# Patient Record
Sex: FEMALE | Race: ASIAN | Employment: FULL TIME | URBAN - METROPOLITAN AREA
[De-identification: names, ages, dates, MRNs, and addresses within clinical notes are randomized per-mention and may not be internally consistent; named-entity substitution may affect disease eponyms.]

---

## 2018-04-12 ENCOUNTER — HOSPITAL ENCOUNTER (INPATIENT)
Age: 48
LOS: 2 days | Discharge: HOME | DRG: 872 | End: 2018-04-14
Attending: EMERGENCY MEDICINE | Admitting: INTERNAL MEDICINE
Payer: COMMERCIAL

## 2018-04-12 ENCOUNTER — APPOINTMENT (OUTPATIENT)
Dept: CT IMAGING | Age: 48
DRG: 872 | End: 2018-04-12
Attending: EMERGENCY MEDICINE
Payer: COMMERCIAL

## 2018-04-12 DIAGNOSIS — H60.21 ACUTE MALIGNANT OTITIS EXTERNA OF RIGHT EAR: Primary | ICD-10-CM

## 2018-04-12 DIAGNOSIS — A41.9 SEPSIS, DUE TO UNSPECIFIED ORGANISM: ICD-10-CM

## 2018-04-12 DIAGNOSIS — I10 ESSENTIAL HYPERTENSION: ICD-10-CM

## 2018-04-12 PROBLEM — H60.20 MALIGNANT OTITIS EXTERNA: Status: ACTIVE | Noted: 2018-04-12

## 2018-04-12 LAB
ALBUMIN SERPL-MCNC: 4.2 G/DL (ref 3.5–5)
ALBUMIN/GLOB SERPL: 1.1 {RATIO} (ref 1.1–2.2)
ALP SERPL-CCNC: 77 U/L (ref 45–117)
ALT SERPL-CCNC: 39 U/L (ref 12–78)
ANION GAP SERPL CALC-SCNC: 10 MMOL/L (ref 5–15)
APPEARANCE UR: CLEAR
AST SERPL-CCNC: 22 U/L (ref 15–37)
BASOPHILS # BLD: 0 K/UL (ref 0–0.1)
BASOPHILS NFR BLD: 0 % (ref 0–1)
BILIRUB SERPL-MCNC: 0.5 MG/DL (ref 0.2–1)
BILIRUB UR QL: NEGATIVE
BUN SERPL-MCNC: 14 MG/DL (ref 6–20)
BUN/CREAT SERPL: 15 (ref 12–20)
CALCIUM SERPL-MCNC: 8.9 MG/DL (ref 8.5–10.1)
CHLORIDE SERPL-SCNC: 100 MMOL/L (ref 97–108)
CO2 SERPL-SCNC: 29 MMOL/L (ref 21–32)
COLOR UR: ABNORMAL
CREAT SERPL-MCNC: 0.95 MG/DL (ref 0.55–1.02)
DIFFERENTIAL METHOD BLD: ABNORMAL
EOSINOPHIL # BLD: 0 K/UL (ref 0–0.4)
EOSINOPHIL NFR BLD: 0 % (ref 0–7)
ERYTHROCYTE [DISTWIDTH] IN BLOOD BY AUTOMATED COUNT: 13.2 % (ref 11.5–14.5)
GLOBULIN SER CALC-MCNC: 3.8 G/DL (ref 2–4)
GLUCOSE BLD STRIP.AUTO-MCNC: 129 MG/DL (ref 65–100)
GLUCOSE BLD STRIP.AUTO-MCNC: 177 MG/DL (ref 65–100)
GLUCOSE SERPL-MCNC: 264 MG/DL (ref 65–100)
GLUCOSE UR STRIP.AUTO-MCNC: 500 MG/DL
HCT VFR BLD AUTO: 40 % (ref 35–47)
HGB BLD-MCNC: 13.4 G/DL (ref 11.5–16)
HGB UR QL STRIP: NEGATIVE
IMM GRANULOCYTES # BLD: 0.1 K/UL (ref 0–0.04)
IMM GRANULOCYTES NFR BLD AUTO: 0 % (ref 0–0.5)
KETONES UR QL STRIP.AUTO: NEGATIVE MG/DL
LACTATE SERPL-SCNC: 1.4 MMOL/L (ref 0.4–2)
LACTATE SERPL-SCNC: 2.2 MMOL/L (ref 0.4–2)
LEUKOCYTE ESTERASE UR QL STRIP.AUTO: NEGATIVE
LYMPHOCYTES # BLD: 1 K/UL (ref 0.8–3.5)
LYMPHOCYTES NFR BLD: 8 % (ref 12–49)
MCH RBC QN AUTO: 28.9 PG (ref 26–34)
MCHC RBC AUTO-ENTMCNC: 33.5 G/DL (ref 30–36.5)
MCV RBC AUTO: 86.4 FL (ref 80–99)
MONOCYTES # BLD: 0.6 K/UL (ref 0–1)
MONOCYTES NFR BLD: 5 % (ref 5–13)
NEUTS SEG # BLD: 12.2 K/UL (ref 1.8–8)
NEUTS SEG NFR BLD: 87 % (ref 32–75)
NITRITE UR QL STRIP.AUTO: NEGATIVE
NRBC # BLD: 0 K/UL (ref 0–0.01)
NRBC BLD-RTO: 0 PER 100 WBC
PH UR STRIP: 7 [PH] (ref 5–8)
PLATELET # BLD AUTO: 290 K/UL (ref 150–400)
PMV BLD AUTO: 10.5 FL (ref 8.9–12.9)
POTASSIUM SERPL-SCNC: 3.7 MMOL/L (ref 3.5–5.1)
PROT SERPL-MCNC: 8 G/DL (ref 6.4–8.2)
PROT UR STRIP-MCNC: NEGATIVE MG/DL
RBC # BLD AUTO: 4.63 M/UL (ref 3.8–5.2)
SERVICE CMNT-IMP: ABNORMAL
SERVICE CMNT-IMP: ABNORMAL
SODIUM SERPL-SCNC: 139 MMOL/L (ref 136–145)
SP GR UR REFRACTOMETRY: 1.01 (ref 1–1.03)
UROBILINOGEN UR QL STRIP.AUTO: 0.2 EU/DL (ref 0.2–1)
WBC # BLD AUTO: 13.9 K/UL (ref 3.6–11)

## 2018-04-12 PROCEDURE — 87205 SMEAR GRAM STAIN: CPT | Performed by: INTERNAL MEDICINE

## 2018-04-12 PROCEDURE — 87040 BLOOD CULTURE FOR BACTERIA: CPT | Performed by: EMERGENCY MEDICINE

## 2018-04-12 PROCEDURE — 80053 COMPREHEN METABOLIC PANEL: CPT | Performed by: EMERGENCY MEDICINE

## 2018-04-12 PROCEDURE — 87147 CULTURE TYPE IMMUNOLOGIC: CPT | Performed by: INTERNAL MEDICINE

## 2018-04-12 PROCEDURE — 85025 COMPLETE CBC W/AUTO DIFF WBC: CPT | Performed by: EMERGENCY MEDICINE

## 2018-04-12 PROCEDURE — 83605 ASSAY OF LACTIC ACID: CPT | Performed by: EMERGENCY MEDICINE

## 2018-04-12 PROCEDURE — 74011250636 HC RX REV CODE- 250/636: Performed by: EMERGENCY MEDICINE

## 2018-04-12 PROCEDURE — 74011250636 HC RX REV CODE- 250/636: Performed by: INTERNAL MEDICINE

## 2018-04-12 PROCEDURE — 74011250637 HC RX REV CODE- 250/637: Performed by: INTERNAL MEDICINE

## 2018-04-12 PROCEDURE — 82962 GLUCOSE BLOOD TEST: CPT

## 2018-04-12 PROCEDURE — 70487 CT MAXILLOFACIAL W/DYE: CPT

## 2018-04-12 PROCEDURE — 36415 COLL VENOUS BLD VENIPUNCTURE: CPT | Performed by: EMERGENCY MEDICINE

## 2018-04-12 PROCEDURE — 74011250637 HC RX REV CODE- 250/637: Performed by: EMERGENCY MEDICINE

## 2018-04-12 PROCEDURE — 96365 THER/PROPH/DIAG IV INF INIT: CPT

## 2018-04-12 PROCEDURE — 65660000000 HC RM CCU STEPDOWN

## 2018-04-12 PROCEDURE — 99284 EMERGENCY DEPT VISIT MOD MDM: CPT

## 2018-04-12 PROCEDURE — 74011636320 HC RX REV CODE- 636/320: Performed by: EMERGENCY MEDICINE

## 2018-04-12 PROCEDURE — 81003 URINALYSIS AUTO W/O SCOPE: CPT | Performed by: EMERGENCY MEDICINE

## 2018-04-12 RX ORDER — DEXTROSE 50 % IN WATER (D50W) INTRAVENOUS SYRINGE
12.5-25 AS NEEDED
Status: DISCONTINUED | OUTPATIENT
Start: 2018-04-12 | End: 2018-04-14 | Stop reason: HOSPADM

## 2018-04-12 RX ORDER — FERROUS SULFATE, DRIED 160(50) MG
1 TABLET, EXTENDED RELEASE ORAL
Status: DISCONTINUED | OUTPATIENT
Start: 2018-04-13 | End: 2018-04-14 | Stop reason: HOSPADM

## 2018-04-12 RX ORDER — GLUCOSAM/CHONDRO/HERB 149/HYAL 750-100 MG
1 TABLET ORAL DAILY
COMMUNITY

## 2018-04-12 RX ORDER — DOXYCYCLINE HYCLATE 100 MG
100 TABLET ORAL
Status: COMPLETED | OUTPATIENT
Start: 2018-04-12 | End: 2018-04-12

## 2018-04-12 RX ORDER — SODIUM CHLORIDE 9 MG/ML
75 INJECTION, SOLUTION INTRAVENOUS CONTINUOUS
Status: DISCONTINUED | OUTPATIENT
Start: 2018-04-12 | End: 2018-04-14 | Stop reason: HOSPADM

## 2018-04-12 RX ORDER — SODIUM CHLORIDE 0.9 % (FLUSH) 0.9 %
5-10 SYRINGE (ML) INJECTION AS NEEDED
Status: DISCONTINUED | OUTPATIENT
Start: 2018-04-12 | End: 2018-04-14 | Stop reason: HOSPADM

## 2018-04-12 RX ORDER — ENOXAPARIN SODIUM 100 MG/ML
40 INJECTION SUBCUTANEOUS EVERY 24 HOURS
Status: DISCONTINUED | OUTPATIENT
Start: 2018-04-13 | End: 2018-04-12

## 2018-04-12 RX ORDER — SODIUM CHLORIDE 0.9 % (FLUSH) 0.9 %
10 SYRINGE (ML) INJECTION
Status: COMPLETED | OUTPATIENT
Start: 2018-04-12 | End: 2018-04-12

## 2018-04-12 RX ORDER — OXYCODONE AND ACETAMINOPHEN 5; 325 MG/1; MG/1
1 TABLET ORAL
Status: DISCONTINUED | OUTPATIENT
Start: 2018-04-12 | End: 2018-04-14 | Stop reason: HOSPADM

## 2018-04-12 RX ORDER — SODIUM CHLORIDE 0.9 % (FLUSH) 0.9 %
5-10 SYRINGE (ML) INJECTION EVERY 8 HOURS
Status: DISCONTINUED | OUTPATIENT
Start: 2018-04-12 | End: 2018-04-13

## 2018-04-12 RX ORDER — KETOTIFEN FUMARATE 0.35 MG/ML
1 SOLUTION/ DROPS OPHTHALMIC
COMMUNITY

## 2018-04-12 RX ORDER — CETIRIZINE HCL 10 MG
10 TABLET ORAL
Status: DISCONTINUED | OUTPATIENT
Start: 2018-04-12 | End: 2018-04-14 | Stop reason: HOSPADM

## 2018-04-12 RX ORDER — FLUTICASONE PROPIONATE 50 MCG
2 SPRAY, SUSPENSION (ML) NASAL DAILY
Status: DISCONTINUED | OUTPATIENT
Start: 2018-04-13 | End: 2018-04-14 | Stop reason: HOSPADM

## 2018-04-12 RX ORDER — FLUTICASONE PROPIONATE 50 MCG
1 SPRAY, SUSPENSION (ML) NASAL DAILY
COMMUNITY

## 2018-04-12 RX ORDER — SODIUM CHLORIDE 9 MG/ML
1000 INJECTION, SOLUTION INTRAVENOUS ONCE
Status: COMPLETED | OUTPATIENT
Start: 2018-04-12 | End: 2018-04-12

## 2018-04-12 RX ORDER — SODIUM CHLORIDE 9 MG/ML
50 INJECTION, SOLUTION INTRAVENOUS
Status: COMPLETED | OUTPATIENT
Start: 2018-04-12 | End: 2018-04-12

## 2018-04-12 RX ORDER — ENOXAPARIN SODIUM 100 MG/ML
30 INJECTION SUBCUTANEOUS EVERY 24 HOURS
Status: DISCONTINUED | OUTPATIENT
Start: 2018-04-13 | End: 2018-04-14 | Stop reason: HOSPADM

## 2018-04-12 RX ORDER — MAGNESIUM SULFATE 100 %
4 CRYSTALS MISCELLANEOUS AS NEEDED
Status: DISCONTINUED | OUTPATIENT
Start: 2018-04-12 | End: 2018-04-14 | Stop reason: HOSPADM

## 2018-04-12 RX ORDER — ONDANSETRON 2 MG/ML
4 INJECTION INTRAMUSCULAR; INTRAVENOUS
Status: DISCONTINUED | OUTPATIENT
Start: 2018-04-12 | End: 2018-04-14 | Stop reason: HOSPADM

## 2018-04-12 RX ORDER — LEVOFLOXACIN 5 MG/ML
750 INJECTION, SOLUTION INTRAVENOUS EVERY 24 HOURS
Status: DISCONTINUED | OUTPATIENT
Start: 2018-04-13 | End: 2018-04-14 | Stop reason: HOSPADM

## 2018-04-12 RX ORDER — CETIRIZINE HCL 10 MG
10 TABLET ORAL
COMMUNITY

## 2018-04-12 RX ORDER — ACETAMINOPHEN 500 MG
1000 TABLET ORAL ONCE
Status: COMPLETED | OUTPATIENT
Start: 2018-04-12 | End: 2018-04-12

## 2018-04-12 RX ORDER — INSULIN LISPRO 100 [IU]/ML
INJECTION, SOLUTION INTRAVENOUS; SUBCUTANEOUS
Status: DISCONTINUED | OUTPATIENT
Start: 2018-04-12 | End: 2018-04-14 | Stop reason: HOSPADM

## 2018-04-12 RX ORDER — GUAIFENESIN 100 MG/5ML
81 LIQUID (ML) ORAL DAILY
Status: DISCONTINUED | OUTPATIENT
Start: 2018-04-13 | End: 2018-04-14 | Stop reason: HOSPADM

## 2018-04-12 RX ORDER — ACETAMINOPHEN 325 MG/1
650 TABLET ORAL
Status: DISCONTINUED | OUTPATIENT
Start: 2018-04-12 | End: 2018-04-14 | Stop reason: HOSPADM

## 2018-04-12 RX ORDER — BISACODYL 5 MG
5 TABLET, DELAYED RELEASE (ENTERIC COATED) ORAL DAILY PRN
Status: DISCONTINUED | OUTPATIENT
Start: 2018-04-12 | End: 2018-04-14 | Stop reason: HOSPADM

## 2018-04-12 RX ORDER — LEVOFLOXACIN 5 MG/ML
750 INJECTION, SOLUTION INTRAVENOUS
Status: COMPLETED | OUTPATIENT
Start: 2018-04-12 | End: 2018-04-12

## 2018-04-12 RX ADMIN — ACETAMINOPHEN 650 MG: 325 TABLET ORAL at 22:19

## 2018-04-12 RX ADMIN — SODIUM CHLORIDE 1000 ML: 900 INJECTION, SOLUTION INTRAVENOUS at 16:17

## 2018-04-12 RX ADMIN — Medication 10 ML: at 22:12

## 2018-04-12 RX ADMIN — Medication 10 ML: at 12:01

## 2018-04-12 RX ADMIN — ACETAMINOPHEN 1000 MG: 500 TABLET, FILM COATED ORAL at 11:34

## 2018-04-12 RX ADMIN — Medication 10 ML: at 16:52

## 2018-04-12 RX ADMIN — IOPAMIDOL 100 ML: 755 INJECTION, SOLUTION INTRAVENOUS at 12:01

## 2018-04-12 RX ADMIN — SODIUM CHLORIDE 50 ML/HR: 900 INJECTION, SOLUTION INTRAVENOUS at 12:01

## 2018-04-12 RX ADMIN — LEVOFLOXACIN 750 MG: 5 INJECTION, SOLUTION INTRAVENOUS at 11:36

## 2018-04-12 RX ADMIN — SODIUM CHLORIDE 500 ML: 900 INJECTION, SOLUTION INTRAVENOUS at 11:36

## 2018-04-12 RX ADMIN — DOXYCYCLINE HYCLATE 100 MG: 100 TABLET, COATED ORAL at 11:34

## 2018-04-12 RX ADMIN — SODIUM CHLORIDE 1000 ML: 900 INJECTION, SOLUTION INTRAVENOUS at 11:36

## 2018-04-12 RX ADMIN — SODIUM CHLORIDE 75 ML/HR: 900 INJECTION, SOLUTION INTRAVENOUS at 16:46

## 2018-04-12 NOTE — IP AVS SNAPSHOT
[address] [facility] 
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 My Medications START taking these medications Instructions Each Dose to Equal  
 Morning Noon Evening Bedtime  
 levoFLOXacin 750 mg tablet Commonly known as:  Jakob Payer Your last dose was: Your next dose is: Take 1 Tab by mouth daily. 750 mg  
    
   
   
   
  
 neomycin-polymyxin-hydrocortisone otic solution Commonly known as:  CORTISPORIN Your last dose was: Your next dose is:    
   
   
 3-4 Drops by Otic route four (4) times daily for 10 days. 3-4 Drop  
    
   
   
   
  
 oxyCODONE-acetaminophen 5-325 mg per tablet Commonly known as:  PERCOCET Your last dose was: Your next dose is: Take 1 Tab by mouth every four (4) hours as needed. Max Daily Amount: 6 Tabs. 1 Tab CHANGE how you take these medications Instructions Each Dose to Equal  
 Morning Noon Evening Bedtime  
 cetirizine 10 mg tablet Commonly known as:  ZYRTEC What changed:  Another medication with the same name was removed. Continue taking this medication, and follow the directions you see here. Your last dose was: Your next dose is: Take 10 mg by mouth daily as needed for Allergies. 10 mg CONTINUE taking these medications Instructions Each Dose to Equal  
 Morning Noon Evening Bedtime  
 aspirin 81 mg tablet Your last dose was: Your next dose is: Take 81 mg by mouth daily. 81 mg CALCIUM + D PO Your last dose was: Your next dose is: Take 1 Tab by mouth daily. 1 Tab EXCEDRIN EXTRA STRENGTH PO Your last dose was: Your next dose is: Take 1 Cap by mouth daily as needed for Pain. 1 Cap FLONASE ALLERGY RELIEF 50 mcg/actuation nasal spray Generic drug:  fluticasone Your last dose was: Your next dose is:    
   
   
 1 Spray by Both Nostrils route daily. 1 Spray  
    
   
   
   
  
 losartan 50 mg tab 50 mg, hydroCHLOROthiazide 12.5 mg cap 12.5 mg Your last dose was: Your next dose is: Take 1 Dose by mouth daily. 1 Dose  
    
   
   
   
  
 methyl salicylate-menthol 14-59 % topical cream  
Commonly known as:  Deitra Caprice Your last dose was: Your next dose is:    
   
   
 Apply  to affected area daily as needed for Pain or Stiffness. MULTI-VITAMIN PO Your last dose was: Your next dose is: Take 1 Tab by mouth daily. 1 Tab  
    
   
   
   
  
 omega 3-DHA-EPA-fish oil 1,000 mg (120 mg-180 mg) capsule Your last dose was: Your next dose is: Take 1 Cap by mouth daily. 1 Cap ZADITOR 0.025 % (0.035 %) ophthalmic solution Generic drug:  ketotifen Your last dose was: Your next dose is:    
   
   
 Administer 1 Drop to both eyes daily as needed (allergies). 1 Drop Where to Get Your Medications Information on where to get these meds will be given to you by the nurse or doctor. ! Ask your nurse or doctor about these medications  
  levoFLOXacin 750 mg tablet  
 neomycin-polymyxin-hydrocortisone otic solution  
 oxyCODONE-acetaminophen 5-325 mg per tablet Opioid Education Prescription Opioids: What You Need to Know: 
 
Prescription opioids can be used to help relieve moderate-to-severe pain and are often prescribed following a surgery or injury, or for certain health conditions. These medications can be an important part of treatment but also come with serious risks. Opioids are strong pain medicines. Examples include hydrocodone, oxycodone, fentanyl, and morphine. Heroin is an example of an illegal opioid. It is important to work with your health care provider to make sure you are getting the safest, most effective care. WHAT ARE THE RISKS AND SIDE EFFECTS OF OPIOID USE? Prescription opioids carry serious risks of addiction and overdose, especially with prolonged use. An opioid overdose, often marked by slow breathing, can cause sudden death. The use of prescription opioids can have a number of side effects as well, even when taken as directed. · Tolerance-meaning you might need to take more of a medication for the same pain relief · Physical dependence-meaning you have symptoms of withdrawal when the medication is stopped. Withdrawal symptoms can include nausea, sweating, chills, diarrhea, stomach cramps, and muscle aches. Withdrawal can last up to several weeks, depending on which drug you took and how long you took it. · Increased sensitivity to pain · Constipation · Nausea, vomiting, and dry mouth · Sleepiness and dizziness · Confusion · Depression · Low levels of testosterone that can result in lower sex drive, energy, and strength · Itching and sweating RISKS ARE GREATER WITH:      
· History of drug misuse, substance use disorder, or overdose · Mental health conditions (such as depression or anxiety) · Sleep apnea · Older age (72 years or older) · Pregnancy Avoid alcohol while taking prescription opioids. Also, unless specifically advised by your health care provider, medications to avoid include: · Benzodiazepines (such as Xanax or Valium) · Muscle relaxants (such as Soma or Flexeril) · Hypnotics (such as Ambien or Lunesta) · Other prescription opioids KNOW YOUR OPTIONS Talk to your health care provider about ways to manage your pain that don't involve prescription opioids. Some of these options may actually work better and have fewer risks and side effects. Options may include: 
· Pain relievers such as acetaminophen, ibuprofen, and naproxen · Some medications that are also used for depression or seizures · Physical therapy and exercise · Counseling to help patients learn how to cope better with triggers of pain and stress. · Application of heat or cold compress · Massage therapy · Relaxation techniques Be Informed Make sure you know the name of your medication, how much and how often to take it, and its potential risks & side effects. IF YOU ARE PRESCRIBED OPIOIDS FOR PAIN: 
· Never take opioids in greater amounts or more often than prescribed. Remember the goal is not to be pain-free but to manage your pain at a tolerable level. · Follow up with your primary care provider to: · Work together to create a plan on how to manage your pain. · Talk about ways to help manage your pain that don't involve prescription opioids. · Talk about any and all concerns and side effects. · Help prevent misuse and abuse. · Never sell or share prescription opioids · Help prevent misuse and abuse. · Store prescription opioids in a secure place and out of reach of others (this may include visitors, children, friends, and family).  
· Safely dispose of unused/unwanted prescription opioids: Find your community drug take-back program or your pharmacy mail-back program, or flush them down the toilet, following guidance from the Food and Drug Administration (www.fda.gov/Drugs/ResourcesForYou). · Visit www.cdc.gov/drugoverdose to learn about the risks of opioid abuse and overdose. · If you believe you may be struggling with addiction, tell your health care provider and ask for guidance or call [employer] at 8-916-802-GPPJ. Discharge Instructions None [employer] Announcement We are excited to announce that we are making your provider's discharge notes available to you in [employer]. You will see these notes when they are completed and signed by the physician that discharged you from your recent hospital stay. If you have any questions or concerns about any information you see in [employer], please call the Health Information Department where you were seen or reach out to your Primary Care Provider for more information about your plan of care. Introducing [facility]! Landen Vang introduces [employer] patient portal. Now you can access parts of your medical record, email your doctor's office, and request medication refills online. 1. In your internet browser, go to [email or web address] 2. Click on the First Time User? Click Here link in the Sign In box. You will see the New Member Sign Up page. 3. Enter your [employer] Access Code exactly as it appears below. You will not need to use this code after youve completed the sign-up process. If you do not sign up before the expiration date, you must request a new code. · [employer] Access Code: [email or web address] Expires: 7/11/2018 10:03 AM 
 
4. Enter the last four digits of your Social Security Number (xxxx) and Date of Birth (mm/dd/yyyy) as indicated and click Submit. You will be taken to the next sign-up page. 5. Create a [employer] ID. This will be your [employer] login ID and cannot be changed, so think of one that is secure and easy to remember. 6. Create a [employer] password. You can change your password at any time. 7. Enter your Password Reset Question and Answer. This can be used at a later time if you forget your password. 8. Enter your e-mail address. You will receive e-mail notification when new information is available in [facility] 9. Click Sign Up. You can now view and download portions of your medical record. 10. Click the Download Summary menu link to download a portable copy of your medical information. If you have questions, please visit the Frequently Asked Questions section of the [employer] website. Remember, [employer] is NOT to be used for urgent needs. For medical emergencies, dial 911. Now available from your iPhone and Android! Introducing Tc Damian As a [facility] patient, I wanted to make you aware of our electronic visit tool called Tc Mathieudougglenn. [facility] 24/7 allows you to connect within minutes with a medical provider 24 hours a day, seven days a week via a mobile device or tablet or logging into a secure website from your computer. You can access Tc Damian from anywhere in the [place]. A virtual visit might be right for you when you have a simple condition and feel like you just dont want to get out of bed, or cant get away from work for an appointment, when your regular [facility] provider is not available (evenings, weekends or holidays), or when youre out of town and need minor care. Electronic visits cost only $49 and if the [facility] 24/7 provider determines a prescription is needed to treat your condition, one can be electronically transmitted to a nearby pharmacy*. Please take a moment to enroll today if you have not already done so. The enrollment process is free and takes just a few minutes.   To enroll, please download the [facility] 24/7 giovana to your tablet or phone, or visit [email or web address] to enroll on your computer. And, as an [address] [place] [address] patient with a [employer] account, the results of your visits will be scanned into your electronic medical record and your primary care provider will be able to view the scanned results. We urge you to continue to see your regular [facility] provider for your ongoing medical care. And while your primary care provider may not be the one available when you seek a [employer] virtual visit, the peace of mind you get from getting a real diagnosis real time can be priceless. For more information on [employer], view our Frequently Asked Questions (FAQs) at [email or web address]. Sincerely, 
 
Beena Fallon MD 
Chief Medical Officer [employer] *:  certain medications cannot be prescribed via [employer] Unresulted Labs-Please follow up with your PCP about these lab tests Order Current Status C REACTIVE PROTEIN, QT In process CULTURE, BLOOD, PAIRED Preliminary result CULTURE, WOUND W GRAM STAIN Preliminary result Providers Seen During Your Hospitalization Provider Specialty Primary office phone Yolanda Tyson, DO Emergency Medicine [phone] Dillon Guerra, DO Internal Medicine [phone] Your Primary Care Physician (PCP) Primary Care Physician Office Phone Office Fax Rachel Dia [phone] You are allergic to the following Allergen Reactions Pollen Extracts Not Reported This Time Recent Documentation Height Weight Breastfeeding? BMI OB Status Smoking Status 1.473 m 55.9 kg No 25.76 kg/m2 Having regular periods Never Smoker Emergency Contacts Name Discharge Info Relation Home Work Mobile None,None N/A  AT THIS TIME [6] Patient Belongings The following personal items are in your possession at time of discharge: 
  Dental Appliances: None  Visual Aid: Glasses      Home Medications: None   Jewelry: None  Clothing: Footwear, Pants, Shirt    Other Valuables: None  Personal Items Sent to Safe: none Please provide this summary of care documentation to your next provider. Signatures-by signing, you are acknowledging that this After Visit Summary has been reviewed with you and you have received a copy. Patient Signature:  ____________________________________________________________ Date:  ____________________________________________________________  
  
[facility] Provider Signature:  ____________________________________________________________ Date:  ____________________________________________________________

## 2018-04-12 NOTE — PROGRESS NOTES
TRANSFER - IN REPORT:    Verbal report received from Yarely(name) on Anny Gibson  being received from ED(unit) for routine progression of care      Report consisted of patients Situation, Background, Assessment and   Recommendations(SBAR). Information from the following report(s) SBAR, Kardex, [facility] and Recent Results was reviewed with the receiving nurse. Opportunity for questions and clarification was provided. Assessment completed upon patients arrival to unit and care assumed.

## 2018-04-12 NOTE — ED NOTES
TRANSFER - OUT REPORT:    Verbal report given to DONNELL Villegas (name) on Anny Gibson  being transferred to [facility] (unit) for routine progression of care       Report consisted of patients Situation, Background, Assessment and   Recommendations(SBAR). Information from the following report(s) SBAR, Kardex, ED Summary, Intake/Output, MAR, Recent Results and Med Rec Status was reviewed with the receiving nurse. Lines:   Peripheral IV 04/12/18 Right Antecubital (Active)   Site Assessment Clean, dry, & intact 4/12/2018 10:36 AM   Phlebitis Assessment 0 4/12/2018 10:29 AM   Infiltration Assessment 0 4/12/2018 10:29 AM   Dressing Status Clean, dry, & intact 4/12/2018 10:29 AM   Dressing Type Transparent 4/12/2018 10:29 AM   Hub Color/Line Status Pink;Flushed 4/12/2018 10:29 AM   Action Taken Blood drawn 4/12/2018 10:29 AM        Opportunity for questions and clarification was provided.       Patient transported with:   [employer]

## 2018-04-12 NOTE — PROGRESS NOTES
Bedside shift change report given to Tra Doll (oncoming nurse) by Annemarie Bond (offgoing nurse). Report included the following information SBAR, Kardex, Intake/Output, MAR and Recent Results.

## 2018-04-12 NOTE — PROGRESS NOTES
Bedside shift change report given to Chilango Mays (oncoming nurse) by Dante Fong (offgoing nurse). Report included the following information SBAR, Kardex, Intake/Output, MAR and Recent Results.

## 2018-04-12 NOTE — PROGRESS NOTES
Primary Nurse Rochelle Sosa and Irene SUMMERS, RN performed a dual skin assessment on this patient No impairment noted  Cyril score is 22

## 2018-04-12 NOTE — ED PROVIDER NOTES
EMERGENCY DEPARTMENT HISTORY AND PHYSICAL EXAM      Date: 4/12/2018  Patient Name: Genesis Lovell    History of Presenting Illness     Chief Complaint   Patient presents with    Ear Pain     Ambulatory into triage referred by PCP for swollen lymph node and swollen red ear x last night Pt referred for CT scan and IV antibiotics       History Provided By: Patient    HPI: Genesis Lovell, 50 y.o. female with PMHx significant for HTN and DM, presents ambulatory to the ED as sent by her PCP for evaluation regarding complaint of constant, progressively worsening right sided ear/facial swelling x 2 days. Pt reports associated symptoms of F/C last night and notes presence of a mild sore throat. She states that she saw her PCP regarding symptoms this morning who was concerned symptoms may be due to [address] 54 angina and sent her to the ED for further evaluation and treatment. Pt denies any difficulty swallowing, CP, SOB, or further complaints. She denies tobacco or alcohol use. PCP: Rai Dunn NP    There are no other complaints, changes, or physical findings at this time.     Current Facility-Administered Medications   Medication Dose Route Frequency Provider Last Rate Last Dose    [START ON 4/13/2018] levoFLOXacin (LEVAQUIN) 750 mg in D5W IVPB  750 mg IntraVENous Q24H Anthony Machuca MD        cetirizine (ZYRTEC) tablet 10 mg  10 mg Oral DAILY PRN MD Rubén Burciaga [START ON 4/13/2018] fluticasone (FLONASE) 50 mcg/actuation nasal spray 2 Spray  2 Spray Both Nostrils DAILY Anthony Machuca MD        artificial tears (dextran 70-hypromellose) (NATURAL BALANCE) 0.1-0.3 % ophthalmic solution 2 Drop  2 Drop Both Eyes PRN Anthony Machuca MD        sodium chloride (NS) flush 5-10 mL  5-10 mL IntraVENous Q8H Anthony Machuca MD   10 mL at 04/12/18 2212    sodium chloride (NS) flush 5-10 mL  5-10 mL IntraVENous PRN Anthony Machuca MD        acetaminophen (TYLENOL) tablet 650 mg  650 mg Oral Q4H PRN Beatris Keene MD   650 mg at 04/12/18 2219    oxyCODONE-acetaminophen (PERCOCET) 5-325 mg per tablet 1 Tab  1 Tab Oral Q4H PRN Micheline Andrade MD        ondansetron (ZOFRAN) injection 4 mg  4 mg IntraVENous Q4H PRN Micheline Andrade MD        bisacodyl (DULCOLAX) tablet 5 mg  5 mg Oral DAILY PRN Micheline Andrade MD        0.9% sodium chloride infusion  75 mL/hr IntraVENous CONTINUOUS Micheline Andrade MD 75 mL/hr at 04/12/18 1646 75 mL/hr at 04/12/18 1646    insulin lispro (HUMALOG) injection   SubCUTAneous AC&HS Micheline Andrade MD   Stopped at 04/12/18 1630    glucose chewable tablet 16 g  4 Tab Oral PRN Micheline Andrade MD        dextrose (D50W) injection syrg 12.5-25 g  12.5-25 g IntraVENous PRN Micheline Andrade MD        glucagon (GLUCAGEN) injection 1 mg  1 mg IntraMUSCular PRN Micheline Andrade MD        [START ON 4/13/2018] calcium-vitamin D (OS-DELMA) 500 mg-200 unit tablet  1 Tab Oral DAILY WITH BREAKFAST Micheline Andrade MD        [START ON 4/13/2018] aspirin chewable tablet 81 mg  81 mg Oral DAILY Micheline Andrade MD        [START ON 4/13/2018] enoxaparin (LOVENOX) injection 30 mg  30 mg SubCUTAneous Q24H Micheline Andrade MD           Past History     Past Medical History:  Past Medical History:   Diagnosis Date    Diabetes mellitus ([facility] [address]     Hypertension     Malignant otitis externa 4/12/2018       Past Surgical History:  History reviewed. No pertinent surgical history. Family History:  Family History   Problem Relation Age of Onset    Diabetes Mother     Hypertension Father        Social History:  Social History   Substance Use Topics    Smoking status: Never Smoker    Smokeless tobacco: Never Used    Alcohol use No      Comment: socially once a year       Allergies: Allergies   Allergen Reactions    Pollen Extracts Not Reported This Time         Review of Systems   Review of Systems   Constitutional: Positive for chills and fever. HENT: Positive for sore throat (mild).  Negative for congestion and trouble swallowing. Positive for right sided ear/facial swelling. Eyes: Negative for visual disturbance. Respiratory: Negative for cough and shortness of breath. Cardiovascular: Negative for chest pain and leg swelling. Gastrointestinal: Negative for abdominal pain, blood in stool, diarrhea and nausea. Endocrine: Negative for polyuria. Genitourinary: Negative for dysuria, flank pain, vaginal bleeding and vaginal discharge. Musculoskeletal: Negative for myalgias. Skin: Negative for rash. Allergic/Immunologic: Negative for immunocompromised state. Neurological: Negative for weakness and headaches. Psychiatric/Behavioral: Negative for confusion. Physical Exam   Physical Exam   Constitutional: She is oriented to person, place, and time. She appears well-developed and well-nourished. HENT:   Head: Normocephalic and atraumatic. Moist mucous membranes  Uvula elevates at midline. No trismus. There is submandibular lymphadenopathy. Swelling to right ear with no TTP. Swelling over the mastoid process. Right TM is normal. Right ear canal normal.   No evidence of abscess to ear. Eyes: Conjunctivae are normal. Pupils are equal, round, and reactive to light. Right eye exhibits no discharge. Left eye exhibits no discharge. Neck: Normal range of motion. Neck supple. No tracheal deviation present. No crepitus. No pain with movement of the hyoid. Full ROM. Cardiovascular: Normal rate, regular rhythm and normal heart sounds. No murmur heard. Pulmonary/Chest: Effort normal and breath sounds normal. No respiratory distress. She has no wheezes. She has no rales. Abdominal: Soft. Bowel sounds are normal. There is no tenderness. There is no rebound and no guarding. Musculoskeletal: Normal range of motion. She exhibits no edema, tenderness or deformity. Neurological: She is alert and oriented to person, place, and time. Skin: Skin is warm and dry. No rash noted.  No erythema. Psychiatric: Her behavior is normal.   Nursing note and vitals reviewed. Diagnostic Study Results     Labs -  Recent Results (from the past 12 hour(s))   URINALYSIS W/ RFLX MICROSCOPIC    Collection Time: 04/12/18 11:42 AM   Result Value Ref Range    Color YELLOW/STRAW      Appearance CLEAR CLEAR      Specific gravity 1.012 1.003 - 1.030      pH (UA) 7.0 5.0 - 8.0      Protein NEGATIVE  NEG mg/dL    Glucose 500 (A) NEG mg/dL    Ketone NEGATIVE  NEG mg/dL    Bilirubin NEGATIVE  NEG      Blood NEGATIVE  NEG      Urobilinogen 0.2 0.2 - 1.0 EU/dL    Nitrites NEGATIVE  NEG      Leukocyte Esterase NEGATIVE  NEG     CULTURE, WOUND W GRAM STAIN    Collection Time: 04/12/18  4:14 PM   Result Value Ref Range    Special Requests: NO SPECIAL REQUESTS      GRAM STAIN NO WBC'S SEEN      GRAM STAIN NO ORGANISMS SEEN      Culture result: PENDING    LACTIC ACID    Collection Time: 04/12/18  4:22 PM   Result Value Ref Range    Lactic acid 1.4 0.4 - 2.0 MMOL/L   GLUCOSE, POC    Collection Time: 04/12/18  4:47 PM   Result Value Ref Range    Glucose (POC) 129 (H) 65 - 100 mg/dL    Performed by Lorrane Carjessi    GLUCOSE, POC    Collection Time: 04/12/18  9:06 PM   Result Value Ref Range    Glucose (POC) 177 (H) 65 - 100 mg/dL    Performed by Lorrane Carjessi        Radiologic Studies -  CT Results  (Last 48 hours)               04/12/18 1201  CT MAXILLOFACIAL W CONT Final result    Impression:  IMPRESSION: Inflammatory changes involving the right ear and proximal right   external auditory canal, with no associated fluid collection. Mild edema in the   superficial lobe of the right parotid gland is likely secondary to the   inflammatory process involving the ear. Borderline-enlarged right cervical lymph   nodes are likely reactive. Clinical follow-up is recommended.                    Narrative:  EXAM:  CT MAXILLOFACIAL W CONT       INDICATION:   Mass, lump or swelling, maxface; infection, assess for abscess COMPARISON:  None. CONTRAST:   100 mL of Isovue-300       TECHNIQUE:  Multislice helical CT of the facial bones was performed in the axial   plane during uneventful rapid bolus intravenous contrast administration. Coronal   and sagittal reformations were generated. CT dose reduction was achieved   through use of a standardized protocol tailored for this examination and   automatic exposure control for dose modulation. FINDINGS:       There is focal skin thickening in the right ear, extending into the proximal   right external auditory canal. Adjacent subcutaneous fat stranding is evident,   but there is no associated fluid collection. Mild edema is seen in the   superficial lobe of the right parotid gland. There are borderline enlarged right   cervical lymph nodes. There is no facial fracture or other osseous abnormality. The visualized paranasal sinuses and mastoid air cells are clear. The globes, optic nerves and extraocular muscles are normal.       No abnormalities are identified within the visualized portions of the brain or   nasopharynx. Medical Decision Making   I am the first provider for this patient. I reviewed the vital signs, available nursing notes, past medical history, past surgical history, family history and social history. Vital Signs-Reviewed the patient's vital signs. Patient Vitals for the past 12 hrs:   Temp Pulse Resp BP SpO2   04/12/18 2233 (!) 100.5 °F (38.1 °C) 92 18 136/77 98 %   04/12/18 1951 99.6 °F (37.6 °C) 94 18 133/74 96 %   04/12/18 1642 98.9 °F (37.2 °C) 95 18 109/64 99 %   04/12/18 1145 - (!) 103 18 142/81 99 %       Records Reviewed: Nursing Notes and Old Medical Records    Provider Notes (Medical Decision Making):   Physical exam consistent with malignant otitis externa. Pt is tachycardic, has signs of SIRS, will likely need hospitalization for abx.  No evidence of abscess on physical exam.     ED Course:   Initial assessment performed. The patients presenting problems have been discussed, and they are in agreement with the care plan formulated and outlined with them. I have encouraged them to ask questions as they arise throughout their visit. 10:25 AM - I suspect that this patient has an active infection. 11:12 AM - The patient met criteria for severe sepsis at this time. PROVIDER SEPSIS PHYSICAL EXAM EVAL  Vital signs reviewed (see nursing documentation for further details):  Vitals:    04/12/18 1145 04/12/18 1642 04/12/18 1951 04/12/18 2233   BP: 142/81 109/64 133/74 136/77   Pulse: (!) 103 95 94 92   Resp: 18 18 18 18   Temp:  98.9 °F (37.2 °C) 99.6 °F (37.6 °C) (!) 100.5 °F (38.1 °C)   SpO2: 99% 99% 96% 98%     Cardiac exam:Tachycardic  Pulmonary exam:Normal  Peripheral pulses:Normal  Capillary refill:Normal  Skin exam:pink    Exam performed by Marge Golden, *    CONSULT NOTE:   12:56 PM   Mily Dorado DO spoke with Jonathan Desir MD,   Specialty: Hospitalist  Discussed pt's hx, disposition, and available diagnostic and imaging results. Reviewed care plans. Consultant will evaluate pt for admission. CRITICAL CARE NOTE:  IMPENDING DETERIORATION -Cardiovascular and Metabolic  ASSOCIATED RISK FACTORS - Metabolic changes and Dehydration  MANAGEMENT- Bedside Assessment  INTERPRETATION -  CT Scan, ECG and Blood Pressure  INTERVENTIONS - fluid administration, metabolic interventions, antibiotics  CASE REVIEW - Hospitalist and Nursing  TREATMENT RESPONSE -Improved  PERFORMED BY - Self  NOTES:  I have spent 35 minutes of critical care time involved in lab review, consultations with specialist, family decision- making, bedside attention and documentation. During this entire length of time I was immediately available to the patient.   Mily Dorado DO    Disposition:  Admit Note:  12:56 PM  Patient is being admitted to the hospital by Dr. Jonathan Desir MD.  The results of their tests and reasons for their admission have been discussed with them and/or available family. They convey agreement and understanding for the need to be admitted and for their admission diagnosis. Consultation has been made with the inpatient physician specialist for hospitalization. PLAN:  1. Admit to Hospitalist    Diagnosis     Clinical Impression:   1. Acute malignant otitis externa of right ear    2. Essential hypertension    3. Sepsis, due to unspecified organism [facility])        Attestations: This note is prepared by Teresita Encinas, acting as Scribe for Madelaine Tripp DO. Madelaine Tripp DO: The scribe's documentation has been prepared under my direction and personally reviewed by me in its entirety. I confirm that the note above accurately reflects all work, treatment, procedures, and medical decision making performed by me.

## 2018-04-12 NOTE — PROGRESS NOTES
Pharmacy  Enoxaparin (Lovenox®) Monitoring/Dosing      Indication: VTE prophylaxis     Current Dose: Enoxaparin 40 subcutaneously every 24 hours    Creatinine Clearance (mL/min): >30 mL/min      Labs:  Recent Labs      04/12/18   1027   CREA  0.95   HGB  13.4   PLT  290     Wt Readings from Last 1 Encounters:   04/12/18 55.9 kg (123 lb 3.8 oz)     Ht Readings from Last 1 Encounters:   04/12/18 147.3 cm (58\")       Impression/Plan:    Scr stable   Empirically adjusted enoxaparin regimen from 40 mg to 30 mg subcutaneously q 24 hours per protocol as TBW <60 kg      Thanks,    Karie Price, PharmD, [facility]

## 2018-04-12 NOTE — PROGRESS NOTES
Pharmacy Clarification of Prior to Admission Medication Regimen     The patient was interviewed regarding clarification of the prior to admission medication regimen and was questioned regarding use of any other inhalers, topical products, over the counter medications, herbal medications, vitamin products or ophthalmic/nasal/otic medication use. Information Obtained From: Patient, RX Query    Pertinent Pharmacy Findings:   Updated patients preferred outpatient pharmacy to: [facility] [address] [place] [facility] [address], [place] - [address] [facility]    PTA medication list was corrected to the following:     Prior to Admission Medications   Prescriptions Last Dose Informant Patient Reported? Taking? ASPIRIN/ACETAMINOPHEN/CAFFEINE (EXCEDRIN EXTRA STRENGTH PO) 2018 at Unknown time Self Yes Yes   Sig: Take 1 Cap by mouth daily as needed for Pain. CALCIUM CARBONATE/VITAMIN D3 (CALCIUM + D PO) 2018 at Unknown time Self Yes Yes   Sig: Take 1 Tab by mouth daily. MULTI-VITAMIN PO 2018 at Unknown time Self Yes Yes   Sig: Take 1 Tab by mouth daily. aspirin 81 mg tablet 2018 at Unknown time Self Yes Yes   Sig: Take 81 mg by mouth daily. cetirizine (ZYRTEC) 10 mg tablet 2018 at Unknown time Self Yes Yes   Sig: Take 10 mg by mouth daily as needed for Allergies. fluticasone (FLONASE ALLERGY RELIEF) 50 mcg/actuation nasal spray 2018 at Unknown time Self Yes Yes   [place] by Both Nostrils route daily. ketotifen (ZADITOR) 0.025 % (0.035 %) ophthalmic solution 2018 at Unknown time Self Yes Yes   Sig: Administer 1 Drop to both eyes daily as needed (allergies). losartan 50 mg tab 50 mg, hydroCHLOROthiazide 12.5 mg cap 12.5 mg 2018 at Unknown time Self Yes Yes   Sig: Take 1 Dose by mouth daily. methyl salicylate-menthol (BENGAY) 15-10 % topical cream 4/10/2018 at Unknown time Self Yes Yes   Sig: Apply  to affected area daily as needed for Pain or Stiffness.    omega 3-DHA-EPA-fish oil 1,000 mg (120 mg-180 mg) capsule 4/11/2018 at Unknown time Self Yes Yes   Sig: Take 1 Cap by mouth daily.       Facility-Administered Medications: None          Thank you,  Celine Sims CPhT  Medication History Pharmacy Technician

## 2018-04-12 NOTE — ED TRIAGE NOTES
Assumed care of pt ambulatory from triage. Pt reports CC of ear swelling since last night. Pt reports yesterday morning she noticed her right gland was tender and last night her ear started swelling. This morning it was worse. Pt was seen at PCP and referred to ER. Pt on monitor x 2  Pt denies CP/ SOB/ N/V. Pt reports body aches. Pt resting on stretcher in POC with call bell in reach.

## 2018-04-12 NOTE — H&P
Hospitalist Admission Note    NAME: Eliana Lewis   :  1970   MRN:  [id number]     Date/Time:  2018 3:01 PM    Patient PCP: Milana Muse, HASMUKH  ______________________________________________________________________  Given the patient's current clinical presentation, I have a high level of concern for decompensation if discharged from the emergency department. Complex decision making was performed, which includes reviewing the patient's available past medical records, laboratory results, and x-ray films. My assessment of this patient's clinical condition and my plan of care is as follows. Assessment / Plan:  Malignant Otitis Externa  Severe Sepsis with tachycardia, Fever and Leukocytosis of 13.9  Elevated Lactic Acid level  -admit to telemetry  -IV Levaquin  -NS at 75 mL/hr  -percocet prn pain not controlled with acetaminophen  -await BCx's  -Get Culture of right ear canal  -monitor lactate    Type II DM, Diet controlled, A1c in Oct/2017 was 6.5 per patient report  -check A1c and place on SSI    Hypertension  -hold home losartan/HCTZ at this time    Code Status: Full    DVT Prophylaxis: lovenox  GI Prophylaxis: not indicated    Baseline: Functional      Subjective:   CHIEF COMPLAINT: ear pain and swelling    HISTORY OF PRESENT ILLNESS:     Clinton Be is a 50 y.o.  female who presents with right ear pain and swelling for the last 2 days. Patient reports that the tenderness started behind her right ear and rapidly progressed to involve her ear as well as swelling in her face. Patient reports fevers, chills and aching this am which prompted her presentation to her PCP where she was referred to the [facility] ED. Patient reports mild sore throat, swollen glands but denies any other concerning symptoms. In the ED patient was diagnosed with Malignant Otitis Externa and CT showed:  \"IMPRESSION: Inflammatory changes involving the right ear and proximal right external auditory canal, with no associated fluid collection. Mild edema in the superficial lobe of the right parotid gland is likely secondary to the inflammatory process involving the ear. Borderline-enlarged right cervical lymph nodes are likely reactive. Clinical follow-up is recommended. \"    Patient was febrile to 102.4, WBC was measured to 13.9. We were asked to admit for work up and evaluation of the above problems. Past Medical History:   Diagnosis Date    Diabetes mellitus ([address]     Hypertension     Malignant otitis externa 4/12/2018        Social History   Substance Use Topics    Smoking status: Never Smoker    Smokeless tobacco: Never Used    Alcohol use No      Comment: socially once a year        Family History   Problem Relation Age of Onset    Diabetes Mother     Hypertension Father      Allergies   Allergen Reactions    Pollen Extracts Not Reported This Time        Prior to Admission medications    Medication Sig Start Date End Date Taking? Authorizing Provider   cetirizine (ZYRTEC) 10 mg tablet Take 10 mg by mouth daily as needed for Allergies. Yes Dwayne Other, MD   losartan 50 mg tab 50 mg, hydroCHLOROthiazide 12.5 mg cap 12.5 mg Take 1 Dose by mouth daily. Yes Phys Other, MD   omega 3-DHA-EPA-fish oil 1,000 mg (120 mg-180 mg) capsule Take 1 Cap by mouth daily. Yes Phys Other, MD   ketotifen (ZADITOR) 0.025 % (0.035 %) ophthalmic solution Administer 1 Drop to both eyes daily as needed (allergies). Yes Dwayne Spring MD   ASPIRIN/ACETAMINOPHEN/CAFFEINE (EXCEDRIN EXTRA STRENGTH PO) Take 1 Cap by mouth daily as needed for Pain. Yes Dwayne Other, MD   fluticasone (FLONASE ALLERGY RELIEF) 50 mcg/actuation nasal spray 1 [place] by Both Nostrils route daily. Yes Phys Other, MD   methyl salicylate-menthol (BENGAY) 15-10 % topical cream Apply  to affected area daily as needed for Pain or Stiffness. Yes Phys Other, MD   aspirin 81 mg tablet Take 81 mg by mouth daily.    Yes Historical Provider   MULTI-VITAMIN PO Take 1 Tab by mouth daily. Yes Historical Provider   CALCIUM CARBONATE/VITAMIN D3 (CALCIUM + D PO) Take 1 Tab by mouth daily. Yes Historical Provider       REVIEW OF SYSTEMS:     A complete 10 point ROS was reviewed and is negative other than stated as per HPI with the exception of polyuria (reports fasting BG's are typically 160's). Objective:   VITALS:    Visit Vitals    /81    Pulse (!) 103    Temp (!) 102.4 °F (39.1 °C)    Resp 18    Ht 4' 10\" (1.473 m)    Wt 55.9 kg (123 lb 3.8 oz)    SpO2 99%    BMI 25.76 kg/m2       PHYSICAL EXAM:    General:    Alert, cooperative, no distress, appears stated age. HEENT: Atraumatic, anicteric sclerae, pink conjunctivae     No oral ulcers, mucosa dry, throat clear, dentition fair; submandibular LN's palpable on right  Neck:  Supple, symmetrical,  Thyroid not enlarged  Lungs:   Clear to auscultation bilaterally. No Wheezing or Rhonchi. No rales. Chest wall:  No tenderness  No Accessory muscle use. Heart:   Regular  Rhythm with tachycardia,  No  murmur   No edema  Abdomen:   Soft, non-tender. Not distended. Bowel sounds normal  Extremities: No cyanosis. No clubbing, Skin turgor normal, Capillary refill normal  Skin:     Not pale. Not Jaundiced  No rashes other than ear and mild erythema on left forearm  Psych:  Good insight. Not depressed. Not anxious or agitated. Neurologic: EOMs intact. No facial asymmetry. No aphasia or slurred speech. No focal neurologic deficits.  Alert and oriented X 4.                 _______________________________________________________________________  Care Plan discussed with:    Comments   Patient x    Family      RN     Care Manager                    Consultant:      _______________________________________________________________________  Expected  Disposition:   Home with Family x   HH/PT/OT/RN    SNF/LTC    CASH    ________________________________________________________________________  TOTAL TIME:  48 Minutes    Critical Care Provided     Minutes non procedure based      Comments    x Reviewed previous records   >50% of visit spent in counseling and coordination of care x Discussion with patient and/or family and questions answered       ________________________________________________________________________  Signed: Mara Ramirez MD    Procedures: see electronic medical records for all procedures/Xrays and details which were not copied into this note but were reviewed prior to creation of Plan. LAB DATA REVIEWED:    Recent Results (from the past 24 hour(s))   CBC WITH AUTOMATED DIFF    Collection Time: 04/12/18 10:27 AM   Result Value Ref Range    WBC 13.9 (H) 3.6 - 11.0 K/uL    RBC 4.63 3.80 - 5.20 M/uL    HGB 13.4 11.5 - 16.0 g/dL    HCT 40.0 35.0 - 47.0 %    MCV 86.4 80.0 - 99.0 FL    MCH 28.9 26.0 - 34.0 PG    MCHC 33.5 30.0 - 36.5 g/dL    RDW 13.2 11.5 - 14.5 %    PLATELET 510 619 - 875 K/uL    MPV 10.5 8.9 - 12.9 FL    NRBC 0.0 0  WBC    ABSOLUTE NRBC 0.00 0.00 - 0.01 K/uL    NEUTROPHILS 87 (H) 32 - 75 %    LYMPHOCYTES 8 (L) 12 - 49 %    MONOCYTES 5 5 - 13 %    EOSINOPHILS 0 0 - 7 %    BASOPHILS 0 0 - 1 %    IMMATURE GRANULOCYTES 0 0.0 - 0.5 %    ABS. NEUTROPHILS 12.2 (H) 1.8 - 8.0 K/UL    ABS. LYMPHOCYTES 1.0 0.8 - 3.5 K/UL    ABS. MONOCYTES 0.6 0.0 - 1.0 K/UL    ABS. EOSINOPHILS 0.0 0.0 - 0.4 K/UL    ABS. BASOPHILS 0.0 0.0 - 0.1 K/UL    ABS. IMM.  GRANS. 0.1 (H) 0.00 - 0.04 K/UL    DF AUTOMATED     METABOLIC PANEL, COMPREHENSIVE    Collection Time: 04/12/18 10:27 AM   Result Value Ref Range    Sodium 139 136 - 145 mmol/L    Potassium 3.7 3.5 - 5.1 mmol/L    Chloride 100 97 - 108 mmol/L    CO2 29 21 - 32 mmol/L    Anion gap 10 5 - 15 mmol/L    Glucose 264 (H) 65 - 100 mg/dL    BUN 14 6 - 20 MG/DL    Creatinine 0.95 0.55 - 1.02 MG/DL    BUN/Creatinine ratio 15 12 - 20      GFR est AA >60 >60 ml/min/1.73m2    GFR est non-AA >60 >60 ml/min/1.73m2    Calcium 8.9 8.5 - 10.1 MG/DL    Bilirubin, total 0.5 0.2 - 1.0 MG/DL    ALT (SGPT) 39 12 - 78 U/L    AST (SGOT) 22 15 - 37 U/L    Alk.  phosphatase 77 45 - 117 U/L    Protein, total 8.0 6.4 - 8.2 g/dL    Albumin 4.2 3.5 - 5.0 g/dL    Globulin 3.8 2.0 - 4.0 g/dL    A-G Ratio 1.1 1.1 - 2.2     LACTIC ACID    Collection Time: 04/12/18 10:27 AM   Result Value Ref Range    Lactic acid 2.2 (HH) 0.4 - 2.0 MMOL/L   URINALYSIS W/ RFLX MICROSCOPIC    Collection Time: 04/12/18 11:42 AM   Result Value Ref Range    Color YELLOW/STRAW      Appearance CLEAR CLEAR      Specific gravity 1.012 1.003 - 1.030      pH (UA) 7.0 5.0 - 8.0      Protein NEGATIVE  NEG mg/dL    Glucose 500 (A) NEG mg/dL    Ketone NEGATIVE  NEG mg/dL    Bilirubin NEGATIVE  NEG      Blood NEGATIVE  NEG      Urobilinogen 0.2 0.2 - 1.0 EU/dL    Nitrites NEGATIVE  NEG      Leukocyte Esterase NEGATIVE  NEG

## 2018-04-13 LAB
ALBUMIN SERPL-MCNC: 2.9 G/DL (ref 3.5–5)
ALBUMIN/GLOB SERPL: 0.8 {RATIO} (ref 1.1–2.2)
ALP SERPL-CCNC: 48 U/L (ref 45–117)
ALT SERPL-CCNC: 25 U/L (ref 12–78)
ANION GAP SERPL CALC-SCNC: 7 MMOL/L (ref 5–15)
AST SERPL-CCNC: 12 U/L (ref 15–37)
BASOPHILS # BLD: 0 K/UL (ref 0–0.1)
BASOPHILS NFR BLD: 0 % (ref 0–1)
BILIRUB SERPL-MCNC: 0.4 MG/DL (ref 0.2–1)
BUN SERPL-MCNC: 8 MG/DL (ref 6–20)
BUN/CREAT SERPL: 11 (ref 12–20)
CALCIUM SERPL-MCNC: 8.2 MG/DL (ref 8.5–10.1)
CHLORIDE SERPL-SCNC: 110 MMOL/L (ref 97–108)
CO2 SERPL-SCNC: 24 MMOL/L (ref 21–32)
CREAT SERPL-MCNC: 0.7 MG/DL (ref 0.55–1.02)
DIFFERENTIAL METHOD BLD: ABNORMAL
EOSINOPHIL # BLD: 0 K/UL (ref 0–0.4)
EOSINOPHIL NFR BLD: 0 % (ref 0–7)
ERYTHROCYTE [DISTWIDTH] IN BLOOD BY AUTOMATED COUNT: 13.5 % (ref 11.5–14.5)
EST. AVERAGE GLUCOSE BLD GHB EST-MCNC: 154 MG/DL
GLOBULIN SER CALC-MCNC: 3.6 G/DL (ref 2–4)
GLUCOSE BLD STRIP.AUTO-MCNC: 121 MG/DL (ref 65–100)
GLUCOSE BLD STRIP.AUTO-MCNC: 129 MG/DL (ref 65–100)
GLUCOSE BLD STRIP.AUTO-MCNC: 138 MG/DL (ref 65–100)
GLUCOSE BLD STRIP.AUTO-MCNC: 149 MG/DL (ref 65–100)
GLUCOSE SERPL-MCNC: 136 MG/DL (ref 65–100)
HBA1C MFR BLD: 7 % (ref 4.2–6.3)
HCT VFR BLD AUTO: 33.4 % (ref 35–47)
HGB BLD-MCNC: 11.2 G/DL (ref 11.5–16)
IMM GRANULOCYTES # BLD: 0.1 K/UL (ref 0–0.04)
IMM GRANULOCYTES NFR BLD AUTO: 1 % (ref 0–0.5)
LYMPHOCYTES # BLD: 1.6 K/UL (ref 0.8–3.5)
LYMPHOCYTES NFR BLD: 16 % (ref 12–49)
MCH RBC QN AUTO: 29 PG (ref 26–34)
MCHC RBC AUTO-ENTMCNC: 33.5 G/DL (ref 30–36.5)
MCV RBC AUTO: 86.5 FL (ref 80–99)
MONOCYTES # BLD: 0.6 K/UL (ref 0–1)
MONOCYTES NFR BLD: 6 % (ref 5–13)
NEUTS SEG # BLD: 7.9 K/UL (ref 1.8–8)
NEUTS SEG NFR BLD: 77 % (ref 32–75)
NRBC # BLD: 0 K/UL (ref 0–0.01)
NRBC BLD-RTO: 0 PER 100 WBC
PLATELET # BLD AUTO: 240 K/UL (ref 150–400)
PMV BLD AUTO: 10.3 FL (ref 8.9–12.9)
POTASSIUM SERPL-SCNC: 3.4 MMOL/L (ref 3.5–5.1)
PROT SERPL-MCNC: 6.5 G/DL (ref 6.4–8.2)
RBC # BLD AUTO: 3.86 M/UL (ref 3.8–5.2)
SERVICE CMNT-IMP: ABNORMAL
SODIUM SERPL-SCNC: 141 MMOL/L (ref 136–145)
WBC # BLD AUTO: 10.2 K/UL (ref 3.6–11)

## 2018-04-13 PROCEDURE — 65660000000 HC RM CCU STEPDOWN

## 2018-04-13 PROCEDURE — 74011250637 HC RX REV CODE- 250/637: Performed by: INTERNAL MEDICINE

## 2018-04-13 PROCEDURE — 85025 COMPLETE CBC W/AUTO DIFF WBC: CPT | Performed by: INTERNAL MEDICINE

## 2018-04-13 PROCEDURE — 82962 GLUCOSE BLOOD TEST: CPT

## 2018-04-13 PROCEDURE — 74011250636 HC RX REV CODE- 250/636: Performed by: INTERNAL MEDICINE

## 2018-04-13 PROCEDURE — 80053 COMPREHEN METABOLIC PANEL: CPT | Performed by: INTERNAL MEDICINE

## 2018-04-13 PROCEDURE — 83036 HEMOGLOBIN GLYCOSYLATED A1C: CPT | Performed by: INTERNAL MEDICINE

## 2018-04-13 PROCEDURE — 36415 COLL VENOUS BLD VENIPUNCTURE: CPT | Performed by: INTERNAL MEDICINE

## 2018-04-13 RX ADMIN — ACETAMINOPHEN 650 MG: 325 TABLET ORAL at 09:12

## 2018-04-13 RX ADMIN — SODIUM CHLORIDE 75 ML/HR: 900 INJECTION, SOLUTION INTRAVENOUS at 03:31

## 2018-04-13 RX ADMIN — Medication 10 ML: at 05:43

## 2018-04-13 RX ADMIN — ENOXAPARIN SODIUM 30 MG: 30 INJECTION SUBCUTANEOUS at 08:01

## 2018-04-13 RX ADMIN — SODIUM CHLORIDE 75 ML/HR: 900 INJECTION, SOLUTION INTRAVENOUS at 20:45

## 2018-04-13 RX ADMIN — CALCIUM CARBONATE-VITAMIN D TAB 500 MG-200 UNIT 1 TABLET: 500-200 TAB at 08:00

## 2018-04-13 RX ADMIN — OXYCODONE HYDROCHLORIDE AND ACETAMINOPHEN 1 TABLET: 5; 325 TABLET ORAL at 19:08

## 2018-04-13 RX ADMIN — LEVOFLOXACIN 750 MG: 5 INJECTION, SOLUTION INTRAVENOUS at 12:06

## 2018-04-13 RX ADMIN — Medication 10 ML: at 20:46

## 2018-04-13 RX ADMIN — ASPIRIN 81 MG 81 MG: 81 TABLET ORAL at 08:00

## 2018-04-13 RX ADMIN — FLUTICASONE PROPIONATE 2 SPRAY: 50 SPRAY, METERED NASAL at 10:21

## 2018-04-13 NOTE — PROGRESS NOTES
Bedside shift change report given to Sadaf Love (oncoming nurse) by Tolu Be (offgoing nurse). Report included the following information SBAR, Kardex, Intake/Output, MAR and Recent Results. Zone Phone for oncoming shift:   [phone]    Shift Summary: Pt rested quietly through night. Some issues with ear pain, relieved by medication. LDAs               Peripheral IV 04/12/18 Right Antecubital (Active)   Site Assessment Clean, dry, & intact 4/13/2018  3:10 AM   Phlebitis Assessment 0 4/13/2018  3:10 AM   Infiltration Assessment 0 4/13/2018  3:10 AM   Dressing Status Clean, dry, & intact 4/13/2018  3:10 AM   Dressing Type Transparent 4/13/2018  3:10 AM   Hub Color/Line Status Pink; Infusing 4/13/2018  3:10 AM   Action Taken Blood drawn 4/12/2018 10:29 AM   Alcohol Cap Used Yes 4/12/2018  5:00 PM                        Intake & Output   Date 04/12/18 0700 - 04/13/18 0659 04/13/18 0700 - 04/14/18 0659   Shift 8387-1964 3162-0350 24 Hour Total 1779-0924 8392-7867 24 Hour Total   I  N  T  A  K  E   P.O.  480 480         P. O.  480 480       I.V.  (mL/kg/hr)  900  (1.3) 900  (0.7)         I.V.  900 900       Shift Total  (mL/kg)  1380  (24.7) 1380  (24.7)      O  U  T  P  U  T   Urine  (mL/kg/hr)            Urine Occurrence(s)  2 x 2 x       Shift Total  (mL/kg)         NET  1380 1380      Weight (kg) 55.9 55.9 55.9 55.9 55.9 55.9      Last Bowel Movement Last Bowel Movement Date: 04/11/18   Glucose Checks [] N/A  [x] AC/HS  [] Q6  Concerns:   Nutrition Active Orders   Diet    DIET DIABETIC CONSISTENT CARB Regular       Consults []PT  []OT  []Speech  [x]Case Management   Cardiac Monitoring []N/A [x]Yes Expires:48 hrs

## 2018-04-13 NOTE — PROGRESS NOTES
Hospitalist Progress Note    NAME: Munir Lewis   :  1970   MRN:  [id number]       Assessment / Plan:  Malignant Otitis Externa  Severe Sepsis with tachycardia, Fever and Leukocytosis of 13.9  Elevated Lactic Acid level  C/w IV Levaquin  NS at 75 mL/hr  C/w acetaminophen & percocet for breakthrough pain  percocet prn pain not controlled with acetaminophen  Blood cxs pending: no growth so far  Wound culture: no WBCs/orgs seen, no growth so far  Get Culture of right ear canal  Lactate trended down 2.2-->1.4 now     Type II DM, Diet controlled, A1c in Oct/2017 was 6.5 per patient report  A1C 7.0 was closer to 6.0 previosly per patient  Diet controlled now  Will follow with PCP to consider if meds warranted on DC  C/w SSI & POCs which show good control     Hypertension  stable  On losartan/HCTZ at home, currently normotensive off meds  F/u w PCP     Code Status: Full     DVT Prophylaxis: lovenox  GI Prophylaxis: not indicated     Baseline: Functional     Subjective:     Chief Complaint / Reason for Physician Visit  Still some breakthrough pain with tylenol. Encouraged pt to request the ordered percocet when having breakthroughs. Fevers/chills from prior to admission have not recurred since treatment started here so far. Review of Systems:  Symptom Y/N Comments  Symptom Y/N Comments   Fever/Chills n   Chest Pain n    Poor Appetite n   Edema n    Cough n   Abdominal Pain n    Sputum n   Joint Pain     SOB/PEOPLES n   Pruritis/Rash     Nausea/vomit n   Tolerating PT/OT     Diarrhea    Tolerating Diet     Constipation    Other       Could NOT obtain due to:      Objective:     VITALS:   Last 24hrs VS reviewed since prior progress note.  Most recent are:  Patient Vitals for the past 24 hrs:   Temp Pulse Resp BP SpO2   18 1442 98.2 °F (36.8 °C) 77 14 139/80 98 %   18 1138 98.2 °F (36.8 °C) 76 16 123/71 98 %   18 0755 98.8 °F (37.1 °C) 88 16 132/74 97 %   18 0327 98.6 °F (37 °C) 85 16 114/62 97 %   04/12/18 2233 (!) 100.5 °F (38.1 °C) 92 18 136/77 98 %   04/12/18 1951 99.6 °F (37.6 °C) 94 18 133/74 96 %   04/12/18 1642 98.9 °F (37.2 °C) 95 18 109/64 99 %       Intake/Output Summary (Last 24 hours) at 04/13/18 1526  Last data filed at 04/13/18 0639   Gross per 24 hour   Intake             1380 ml   Output                0 ml   Net             1380 ml        PHYSICAL EXAM:  General: WD, WN. Alert, cooperative, no acute distress    EENT:  EOMI. Anicteric sclerae. MMM  Resp:  CTA bilaterally, no wheezing or rales. No accessory muscle use  CV:  Regular  rhythm,  No edema  GI:  Soft, Non distended, Non tender.  +Bowel sounds  Neurologic:  Alert and oriented X 3, normal speech,   Psych:   Good insight. Not anxious nor agitated  Skin:  Semilunar skin breakdown at behind Rt earlobe No rashes. No jaundice    Reviewed most current lab test results and cultures  YES  Reviewed most current radiology test results   YES  Review and summation of old records today    NO  Reviewed patient's current orders and MAR    YES  PMH/ reviewed - no change compared to H&P  ________________________________________________________________________  Care Plan discussed with:    Comments   Patient x    Family      RN x    Care Manager     Consultant                        Multidiciplinary team rounds were held today with , nursing, pharmacist and clinical coordinator. Patient's plan of care was discussed; medications were reviewed and discharge planning was addressed.      ________________________________________________________________________  Total NON critical care TIME: 30 Minutes      Comments   >50% of visit spent in counseling and coordination of care     ________________________________________________________________________  Dima Res, DO     Procedures: see electronic medical records for all procedures/Xrays and details which were not copied into this note but were reviewed prior to creation of Plan.      LABS:  I reviewed today's most current labs and imaging studies.   Pertinent labs include:  Recent Labs      04/13/18   0325  04/12/18   1027   WBC  10.2  13.9*   HGB  11.2*  13.4   HCT  33.4*  40.0   PLT  240  290     Recent Labs      04/13/18   0325  04/12/18   1027   NA  141  139   K  3.4*  3.7   CL  110*  100   CO2  24  29   GLU  136*  264*   BUN  8  14   CREA  0.70  0.95   CA  8.2*  8.9   ALB  2.9*  4.2   TBILI  0.4  0.5   SGOT  12*  22   ALT  25  39       Signed: Dillon Guerra, DO

## 2018-04-14 VITALS
BODY MASS INDEX: 25.87 KG/M2 | HEIGHT: 58 IN | TEMPERATURE: 98.8 F | HEART RATE: 69 BPM | WEIGHT: 123.24 LBS | DIASTOLIC BLOOD PRESSURE: 83 MMHG | RESPIRATION RATE: 17 BRPM | OXYGEN SATURATION: 97 % | SYSTOLIC BLOOD PRESSURE: 146 MMHG

## 2018-04-14 LAB
ANION GAP SERPL CALC-SCNC: 4 MMOL/L (ref 5–15)
BACTERIA SPEC CULT: ABNORMAL
BACTERIA SPEC CULT: ABNORMAL
BUN SERPL-MCNC: 11 MG/DL (ref 6–20)
BUN/CREAT SERPL: 15 (ref 12–20)
CALCIUM SERPL-MCNC: 8.6 MG/DL (ref 8.5–10.1)
CHLORIDE SERPL-SCNC: 110 MMOL/L (ref 97–108)
CO2 SERPL-SCNC: 28 MMOL/L (ref 21–32)
CREAT SERPL-MCNC: 0.72 MG/DL (ref 0.55–1.02)
CRP SERPL-MCNC: 9.22 MG/DL (ref 0–0.6)
ERYTHROCYTE [SEDIMENTATION RATE] IN BLOOD: 37 MM/HR (ref 0–20)
GLUCOSE BLD STRIP.AUTO-MCNC: 118 MG/DL (ref 65–100)
GLUCOSE SERPL-MCNC: 175 MG/DL (ref 65–100)
GRAM STN SPEC: ABNORMAL
GRAM STN SPEC: ABNORMAL
POTASSIUM SERPL-SCNC: 4.3 MMOL/L (ref 3.5–5.1)
SERVICE CMNT-IMP: ABNORMAL
SERVICE CMNT-IMP: ABNORMAL
SODIUM SERPL-SCNC: 142 MMOL/L (ref 136–145)

## 2018-04-14 PROCEDURE — 74011250637 HC RX REV CODE- 250/637: Performed by: INTERNAL MEDICINE

## 2018-04-14 PROCEDURE — 86140 C-REACTIVE PROTEIN: CPT | Performed by: INTERNAL MEDICINE

## 2018-04-14 PROCEDURE — 74011250636 HC RX REV CODE- 250/636: Performed by: INTERNAL MEDICINE

## 2018-04-14 PROCEDURE — 82962 GLUCOSE BLOOD TEST: CPT

## 2018-04-14 PROCEDURE — 80048 BASIC METABOLIC PNL TOTAL CA: CPT | Performed by: EMERGENCY MEDICINE

## 2018-04-14 PROCEDURE — 85652 RBC SED RATE AUTOMATED: CPT | Performed by: INTERNAL MEDICINE

## 2018-04-14 PROCEDURE — 36415 COLL VENOUS BLD VENIPUNCTURE: CPT | Performed by: EMERGENCY MEDICINE

## 2018-04-14 RX ORDER — LEVOFLOXACIN 750 MG/1
750 TABLET ORAL DAILY
Qty: 10 TAB | Refills: 0 | Status: SHIPPED | OUTPATIENT
Start: 2018-04-14

## 2018-04-14 RX ORDER — OXYCODONE AND ACETAMINOPHEN 5; 325 MG/1; MG/1
1 TABLET ORAL
Qty: 15 TAB | Refills: 0 | Status: SHIPPED | OUTPATIENT
Start: 2018-04-14

## 2018-04-14 RX ORDER — NEOMYCIN SULFATE, POLYMYXIN B SULFATE, HYDROCORTISONE 3.5; 10000; 1 MG/ML; [USP'U]/ML; MG/ML
3-4 SOLUTION/ DROPS AURICULAR (OTIC) 4 TIMES DAILY
Qty: 1 BOTTLE | Refills: 0 | Status: SHIPPED | OUTPATIENT
Start: 2018-04-14 | End: 2018-04-24

## 2018-04-14 RX ADMIN — CALCIUM CARBONATE-VITAMIN D TAB 500 MG-200 UNIT 1 TABLET: 500-200 TAB at 08:42

## 2018-04-14 RX ADMIN — ASPIRIN 81 MG 81 MG: 81 TABLET ORAL at 08:42

## 2018-04-14 RX ADMIN — ONDANSETRON 4 MG: 2 INJECTION INTRAMUSCULAR; INTRAVENOUS at 08:50

## 2018-04-14 RX ADMIN — SODIUM CHLORIDE 75 ML/HR: 900 INJECTION, SOLUTION INTRAVENOUS at 10:53

## 2018-04-14 RX ADMIN — FLUTICASONE PROPIONATE 2 SPRAY: 50 SPRAY, METERED NASAL at 08:43

## 2018-04-14 RX ADMIN — ACETAMINOPHEN 650 MG: 325 TABLET ORAL at 08:49

## 2018-04-14 RX ADMIN — ENOXAPARIN SODIUM 30 MG: 30 INJECTION SUBCUTANEOUS at 08:42

## 2018-04-14 NOTE — DISCHARGE SUMMARY
Hospitalist Discharge Summary     Patient ID:  Alexey Glaser  [id number]  50 y.o.  1970    PCP on record: Hattie Diego NP    Admit date: 4/12/2018  Discharge date and time: 4/14/2018      DISCHARGE DIAGNOSIS:    Malignant otitis externa POA, improved  Sepsis from above, POA, resolved  DM2, stable  HTN      CONSULTATIONS:  None    Excerpted HPI from H&P of Sveta Allen MD:  Rosalia Nunez is a 50 y.o.  female who presents with right ear pain and swelling for the last 2 days. Patient reports that the tenderness started behind her right ear and rapidly progressed to involve her ear as well as swelling in her face. Patient reports fevers, chills and aching this am which prompted her presentation to her PCP where she was referred to the [id number] [place] [address] ED. Patient reports mild sore throat, swollen glands but denies any other concerning symptoms.      In the ED patient was diagnosed with Malignant Otitis Externa and CT showed: \"IMPRESSION: Inflammatory changes involving the right ear and proximal right external auditory canal, with no associated fluid collection. Mild edema in the superficial lobe of the right parotid gland is likely secondary to the inflammatory process involving the ear. Borderline-enlarged right cervical lymph nodes are likely reactive. Clinical follow-up is recommended. \"     Patient was febrile to 102.4, WBC was measured to 13. 9.      We were asked to admit for work up and evaluation of the above problems. ______________________________________________________________________  DISCHARGE SUMMARY/HOSPITAL COURSE:  for full details see H&P, daily progress notes, labs, consult notes.      Malignant Otitis Externa  Severe Sepsis with tachycardia, Fever and Leukocytosis of 13.9  Elevated Lactic Acid level  C/w IV Levaquin  NS at 75 mL/hr  C/w acetaminophen & percocet for breakthrough pain  percocet prn pain not controlled with acetaminophen  Blood cxs pending: no growth so far  Wound culture: no WBCs/orgs seen, no growth so far  Get Culture of right ear canal  Lactate trended down 2.2-->1.4 now      Type II DM, Diet controlled, A1c in Oct/Nov 2017 was 6.5 per patient report  A1C 7.0 was closer to 6.0 previosly per patient  Diet controlled now  Will follow with PCP to consider if meds warranted on DC  C/w SSI & POCs which show good control      Hypertension  stable  On losartan/HCTZ at home, currently normotensive off meds  F/u w PCP      Code Status: Full      DVT Prophylaxis: lovenox  GI Prophylaxis: not indicated      Baseline: Functional    _______________________________________________________________________  Patient seen and examined by me on discharge day. Pertinent Findings:  Gen:    Not in distress  Chest: Clear lungs  CVS:   Regular rhythm. No edema  Abd:  Soft, not distended, not tender  Neuro:  Alert, oriented x 3  _______________________________________________________________________  DISCHARGE MEDICATIONS:   Current Discharge Medication List      START taking these medications    Details   oxyCODONE-acetaminophen (PERCOCET) 5-325 mg per tablet Take 1 Tab by mouth every four (4) hours as needed. Max Daily Amount: 6 Tabs. Qty: 15 Tab, Refills: 0    Associated Diagnoses: Acute malignant otitis externa of right ear      neomycin-polymyxin-hydrocortisone (CORTISPORIN) otic solution 3-4 Drops by Otic route four (4) times daily for 10 days. Qty: 1 Bottle, Refills: 0         CONTINUE these medications which have NOT CHANGED    Details   cetirizine (ZYRTEC) 10 mg tablet Take 10 mg by mouth daily as needed for Allergies. losartan 50 mg tab 50 mg, hydroCHLOROthiazide 12.5 mg cap 12.5 mg Take 1 Dose by mouth daily. omega 3-DHA-EPA-fish oil 1,000 mg (120 mg-180 mg) capsule Take 1 Cap by mouth daily. ketotifen (ZADITOR) 0.025 % (0.035 %) ophthalmic solution Administer 1 Drop to both eyes daily as needed (allergies).       ASPIRIN/ACETAMINOPHEN/CAFFEINE (EXCEDRIN EXTRA STRENGTH PO) Take 1 Cap by mouth daily as needed for Pain. fluticasone (FLONASE ALLERGY RELIEF) 50 mcg/actuation nasal spray 1 [place] by Both Nostrils route daily. methyl salicylate-menthol (BENGAY) 15-10 % topical cream Apply  to affected area daily as needed for Pain or Stiffness. aspirin 81 mg tablet Take 81 mg by mouth daily. Associated Diagnoses: HTN (hypertension)      MULTI-VITAMIN PO Take 1 Tab by mouth daily. CALCIUM CARBONATE/VITAMIN D3 (CALCIUM + D PO) Take 1 Tab by mouth daily. My Recommended Diet, Activity, Wound Care, and follow-up labs are listed in the patient's Discharge Insturctions which I have personally completed and reviewed. ______________________________________________________________________    Risk of deterioration: Low    Condition at Discharge:  Stable  ______________________________________________________________________    Disposition  Home with family, no needs  ______________________________________________________________________    Care Plan discussed with:   Patient, Family, RN, Care Manager, Consultant    ______________________________________________________________________    Code Status: Full Code  ______________________________________________________________________      Follow up with:   PCP : Deonte Hutson NP  Follow-up Information     Follow up With Details HASMUKH Becker  [address] [place] [address]  [phone]        Please complete antibiotics as instructed and follow up with your primary care provider in one week.         Total time in minutes spent coordinating this discharge (includes going over instructions, follow-up, prescriptions, and preparing report for sign off to her PCP) :  30 minutes    Signed:  Julio Gabriel DO

## 2018-04-14 NOTE — PROGRESS NOTES
1900--bedside report received from [facility], rn. Pt resting in bed oriented x 4. Requesting pain medication (MAR), also c/o \" I haven't been the the bathroom in two days\", pt given prune juice. No other complaints at this time. Family at bedside assessment as noted.     2000--up up to shower, family at bedside    0330--am labs drawn and sent to lab per orders, pt denies pain at this time, call bell remains in reach    0700--bedside report given to [facility], rn who is assuming care of pt

## 2018-04-17 LAB
BACTERIA SPEC CULT: NORMAL
SERVICE CMNT-IMP: NORMAL
